# Patient Record
Sex: FEMALE | URBAN - METROPOLITAN AREA
[De-identification: names, ages, dates, MRNs, and addresses within clinical notes are randomized per-mention and may not be internally consistent; named-entity substitution may affect disease eponyms.]

---

## 2018-09-13 DIAGNOSIS — M54.50 LUMBAGO: Primary | ICD-10-CM

## 2018-09-13 DIAGNOSIS — M51.36 DEGENERATION OF LUMBAR INTERVERTEBRAL DISC: ICD-10-CM

## 2018-10-02 ENCOUNTER — CLINICAL SUPPORT (OUTPATIENT)
Dept: REHABILITATION | Facility: OTHER | Age: 63
End: 2018-10-02
Payer: MEDICAID

## 2018-10-02 DIAGNOSIS — G89.29 CHRONIC BILATERAL LOW BACK PAIN WITH BILATERAL SCIATICA: ICD-10-CM

## 2018-10-02 DIAGNOSIS — M25.60 DECREASED RANGE OF MOTION: ICD-10-CM

## 2018-10-02 DIAGNOSIS — M54.42 CHRONIC BILATERAL LOW BACK PAIN WITH BILATERAL SCIATICA: ICD-10-CM

## 2018-10-02 DIAGNOSIS — M54.41 CHRONIC BILATERAL LOW BACK PAIN WITH BILATERAL SCIATICA: ICD-10-CM

## 2018-10-02 DIAGNOSIS — R29.3 POSTURAL IMBALANCE: ICD-10-CM

## 2018-10-02 PROCEDURE — 97161 PT EVAL LOW COMPLEX 20 MIN: CPT | Mod: PN
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
## 2018-10-29 ENCOUNTER — CLINICAL SUPPORT (OUTPATIENT)
Dept: REHABILITATION | Facility: OTHER | Age: 63
End: 2018-10-29
Payer: MEDICAID

## 2018-10-29 DIAGNOSIS — M54.41 CHRONIC BILATERAL LOW BACK PAIN WITH BILATERAL SCIATICA: ICD-10-CM

## 2018-10-29 DIAGNOSIS — G89.29 CHRONIC BILATERAL LOW BACK PAIN WITH BILATERAL SCIATICA: ICD-10-CM

## 2018-10-29 DIAGNOSIS — M54.42 CHRONIC BILATERAL LOW BACK PAIN WITH BILATERAL SCIATICA: ICD-10-CM

## 2018-10-29 DIAGNOSIS — R29.3 POSTURAL IMBALANCE: ICD-10-CM

## 2018-10-29 DIAGNOSIS — M25.60 DECREASED RANGE OF MOTION: ICD-10-CM

## 2018-10-29 PROCEDURE — 97110 THERAPEUTIC EXERCISES: CPT | Mod: PN

## 2018-10-29 NOTE — PROGRESS NOTES
"                            Physical Therapy Daily Treatment Note     Name: Susie Cole  Clinic Number: [id number]    Therapy Diagnosis:   Encounter Diagnoses   Name Primary?    Chronic bilateral low back pain with bilateral sciatica     Decreased range of motion     Postural imbalance      Physician: Myron Vela MD    Visit Date: 10/29/2018  Physician Orders: PT Eval and Treat  Medical Diagnosis from Referral: Lumbago, Degenerationi of lumbar intervertebral disc  Evaluation Date: 10/2/2018  Authorization Period Expiration: 10/31/18  Plan of Care Expiration: 12/31/18  Visit # / Visits authorized: 2/ 6      Time In: 2:18 pm  Time Out: 3:07 pm  Total Billable Time: 25 minutes    Precautions: Standard    Subjective   Pt reports she took her medication prior to PT but she is still having pain. States when she stands to walk her back hurts. Reports improvement in her back pain following session.  She was compliant with home exercise program.  Response to previous treatment: decreased pain following last session  Functional change: pain continues to limit her standing and walking.    Pain: 8/10  Location: back  bilateral    Objective     Susie received therapeutic exercises to develop strength, ROM, flexibility, posture and core stabilization for 39 minutes including:  LTR: 20 reps  SKTC: 3 x 20"  Active hamstring stretch: 20 x 5"  pirifomis stretches 3 x 20"  Gluteal sets 10 x 5"  Bridging 10 x 5"    Susie received cold pack for 10 minutes to low back.      Home Exercises Provided and Patient Education Provided     Education provided:     Written Home Exercises Provided: Patient instructed to cont prior HEP.  Exercises were reviewed and Susie was able to demonstrate them prior to the end of the session.  Susie demonstrated good  understanding of the education provided.     See EMR under Patient Instructions for exercises provided prior visit.    Assessment     Continuing to present to OP PT with increased " low back pain. Patient able to perform there ex in session today without significant increased pain. WIll assess response next visit prior to adding to HEP.    Susie is slowly progressing towards her goals.   Pt prognosis is Fair.     Pt will continue to benefit from skilled outpatient physical therapy to address the deficits listed in the problem list box on initial evaluation, provide pt/family education and to maximize pt's level of independence in the home and community environment.     Pt's spiritual, cultural and educational needs considered and pt agreeable to plan of care and goals.    Anticipated barriers to physical therapy: none    Goals:   Short Term Goals (5 Weeks):   1. Pt will report 20% reduction in pain of the lumbar spine and LE for ease with ADL's (in progress)  2. PT will demonstrate improved trunk strength by a half grade in for ease with upright posture during standing activities. (in progress)  3. Pt will demonstrate improved lumbar spine ROM in all directions by a half grade for ease with bending activities. (in progress)   4. Pt to demonstrate improved functional ability with FOTO limitation <=66% disability. (in progress)     Long Term Goals (10 Weeks):   1. Pt will report being independent with HEP for maintenance of improvements gained during therapy sessions. (in progress)   2. PT will report 50% reduction of pain of the back and LE for ease with dressing and grooming activities.  (in progress)  3. Pt will demonstrate trunk and extremity strength to >=4+/5 without the provocation of pain for ease with household chores. (in progress)  4. Pt will demonstrate appropriate upright posture without external cueing for ease with work related activities. (in progress)  5. Pt to demonstrate improved functional ability with FOTO limitation <=56% disability.(in progress)    Plan     Continue with core strengthening, B LE strengthening, and stretches.    Caesar Gaytan, PT

## 2018-11-15 ENCOUNTER — CLINICAL SUPPORT (OUTPATIENT)
Dept: REHABILITATION | Facility: OTHER | Age: 63
End: 2018-11-15
Payer: MEDICAID

## 2018-11-15 DIAGNOSIS — M54.42 CHRONIC BILATERAL LOW BACK PAIN WITH BILATERAL SCIATICA: ICD-10-CM

## 2018-11-15 DIAGNOSIS — G89.29 CHRONIC BILATERAL LOW BACK PAIN WITH BILATERAL SCIATICA: ICD-10-CM

## 2018-11-15 DIAGNOSIS — M25.60 DECREASED RANGE OF MOTION: ICD-10-CM

## 2018-11-15 DIAGNOSIS — R29.3 POSTURAL IMBALANCE: ICD-10-CM

## 2018-11-15 DIAGNOSIS — M54.41 CHRONIC BILATERAL LOW BACK PAIN WITH BILATERAL SCIATICA: ICD-10-CM

## 2018-11-15 PROCEDURE — 97110 THERAPEUTIC EXERCISES: CPT | Mod: PN | Performed by: PHYSICAL THERAPIST

## 2018-11-15 NOTE — PROGRESS NOTES
"                            Physical Therapy Daily Treatment Note     Name: Susie Cole  Clinic Number: [id number]    Therapy Diagnosis:   Encounter Diagnoses   Name Primary?    Chronic bilateral low back pain with bilateral sciatica     Decreased range of motion     Postural imbalance      Physician: Myron Vela MD    Visit Date: 11/15/2018  Physician Orders: PT Eval and Treat  Medical Diagnosis from Referral: Lumbago, Degeneration of lumbar intervertebral disc  Evaluation Date: 10/2/2018  Authorization Period Expiration: 10/31/18  Plan of Care Expiration: 12/31/18  Visit # / Visits authorized: 3/ 6       Time In: 3:10  pm  Time Out: 4:00 pm  Total Billable Time: 50minutes    Precautions: Standard    Subjective   Pt reports continued pain to B low back today. She says she has been doing exercises at home, but with no change. She says she still has pain with standing and walking.   She was compliant with home exercise program.  Response to previous treatment: decreased pain following last session  Functional change: pain continues to limit her standing and walking.    Pain: 7/10  Location: back  bilateral    Objective       CMS Impairment/Limitation/Restriction for FOTO Lumbar Spine Survey    Therapist reviewed FOTO scores for Susie Cole on 11/15/2018.   FOTO documents entered into [employer] - see Media section.    Limitation Score: 76% (no change from evaluation)  Category: Mobility    Current : CL = least 60% but < 80% impaired, limited or restricted  Goal: CK = at least 40% but < 60% impaired, limited or restricted  Discharge: n/a           Susie received therapeutic exercises to develop strength, ROM, flexibility, posture and core stabilization for 40 minutes including:  LTR: 3 min  SKTC: 3 x 20"  +B KTC with ball 3 min  Active hamstring stretch: 20 x 5"  pirifomis stretches 3 x 20"  Gluteal sets 10 x 5"  Bridging 10 x 5"  +TrA with yoga block squeeze 10" hold x 3 min    Susie received moist heat for " 10 minutes to low back.      Home Exercises Provided and Patient Education Provided     Education provided:   - Office attendance policy    Written Home Exercises Provided: Patient instructed to cont prior HEP.  Exercises were reviewed and Susie was able to demonstrate them prior to the end of the session.  Susie demonstrated good  understanding of the education provided.     See EMR under Patient Instructions for exercises provided prior visit.    Assessment     Pt has made limited progress with therapy due to poor attendance. Today was pt's second treatment since evaluation 10/02/2018. Pt was educated today on importance of attending therapy as well as performing recommended HEP to be able to progress towards goals. Fair tolerance to therex today. Slow pacing of exercises.     Susie is slowly progressing towards her goals.   Pt prognosis is Fair.     Pt will continue to benefit from skilled outpatient physical therapy to address the deficits listed in the problem list box on initial evaluation, provide pt/family education and to maximize pt's level of independence in the home and community environment.     Pt's spiritual, cultural and educational needs considered and pt agreeable to plan of care and goals.    Anticipated barriers to physical therapy: none    Goals:   Short Term Goals (5 Weeks):   1. Pt will report 20% reduction in pain of the lumbar spine and LE for ease with ADL's (in progress)  2. PT will demonstrate improved trunk strength by a half grade in for ease with upright posture during standing activities. (in progress)  3. Pt will demonstrate improved lumbar spine ROM in all directions by a half grade for ease with bending activities. (in progress)   4. Pt to demonstrate improved functional ability with FOTO limitation <=66% disability. (in progress)     Long Term Goals (10 Weeks):   1. Pt will report being independent with HEP for maintenance of improvements gained during therapy sessions. (in  progress)   2. PT will report 50% reduction of pain of the back and LE for ease with dressing and grooming activities.  (in progress)  3. Pt will demonstrate trunk and extremity strength to >=4+/5 without the provocation of pain for ease with household chores. (in progress)  4. Pt will demonstrate appropriate upright posture without external cueing for ease with work related activities. (in progress)  5. Pt to demonstrate improved functional ability with FOTO limitation <=56% disability.(in progress)    Plan     Continue with core strengthening, B LE strengthening, and stretches.    Bridget Schultz, PT

## 2018-11-23 ENCOUNTER — CLINICAL SUPPORT (OUTPATIENT)
Dept: REHABILITATION | Facility: OTHER | Age: 63
End: 2018-11-23
Payer: MEDICAID

## 2018-11-23 DIAGNOSIS — R29.3 POSTURAL IMBALANCE: ICD-10-CM

## 2018-11-23 DIAGNOSIS — M25.60 DECREASED RANGE OF MOTION: ICD-10-CM

## 2018-11-23 DIAGNOSIS — G89.29 CHRONIC BILATERAL LOW BACK PAIN WITH BILATERAL SCIATICA: ICD-10-CM

## 2018-11-23 DIAGNOSIS — M54.42 CHRONIC BILATERAL LOW BACK PAIN WITH BILATERAL SCIATICA: ICD-10-CM

## 2018-11-23 DIAGNOSIS — M54.41 CHRONIC BILATERAL LOW BACK PAIN WITH BILATERAL SCIATICA: ICD-10-CM

## 2018-11-23 PROCEDURE — 97110 THERAPEUTIC EXERCISES: CPT | Mod: PN

## 2018-11-23 NOTE — PROGRESS NOTES
"                            Physical Therapy Daily Treatment Note     Name: Susie Cole  Clinic Number: [id number]    Therapy Diagnosis:   Encounter Diagnoses   Name Primary?    Chronic bilateral low back pain with bilateral sciatica     Decreased range of motion     Postural imbalance      Physician: Myron Vela MD    Visit Date: 11/23/2018  Physician Orders: PT Eval and Treat  Medical Diagnosis from Referral: Lumbago, Degeneration of lumbar intervertebral disc  Evaluation Date: 10/2/2018  Authorization Period Expiration: 10/31/18  Plan of Care Expiration: 12/31/18  Visit # / Visits authorized: 4/ 6       Time In: 11:09  pm  Time Out: 12:02 pm  Total Billable Time: 53 minutes    Precautions: Standard    Subjective   Pt reports that the home exercises help "a little" but that she still has pain in standing.   She was compliant with home exercise program.  Response to previous treatment: decreased pain following last session  Functional change: pain continues to limit her standing and walking.    Pain: 7/10  Location: back  bilateral    Objective       CMS Impairment/Limitation/Restriction for FOTO Lumbar Spine Survey    Therapist reviewed FOTO scores for Susie Cole on 11/23/2018.   FOTO documents entered into [employer] - see Media section.    Limitation Score: 76% (no change from evaluation)  Category: Mobility    Current : CL = least 60% but < 80% impaired, limited or restricted  Goal: CK = at least 40% but < 60% impaired, limited or restricted  Discharge: n/a           Susie received therapeutic exercises to develop strength, ROM, flexibility, posture and core stabilization for 40 minutes including:    LTR: 3 min w/PB  DKTC with ball 3 min  SKTC: 3 x 20"  Active hamstring stretch: 20 x 5"  pirifomis stretches 3 x 20"  Gluteal sets 10 x 5"  Bridging 10 x 5"  TrA with yoga block squeeze 10" hold x 3 min - NP today  +PPT: 10x  +seated PB roll out: 10x    +narrow rows: 20 x OTB  +SALPD 20 x OTB    Susie " received moist heat for 10 minutes to low back. - performed simultaneous with therex today      Home Exercises Provided and Patient Education Provided     Education provided:   - Office attendance policy  - use of sitting and standing trunk flexion frequently throughout the day to reduce c/o pain and improve tolerance with walking and standing.    Written Home Exercises Provided: Patient instructed to cont prior HEP.  Exercises were reviewed and Susie was able to demonstrate them prior to the end of the session.  Susie demonstrated good  understanding of the education provided.     See EMR under Patient Instructions for exercises provided prior visit.    Assessment     Pt has made limited progress with therapy due to poor attendance. Today was pt's fourth treatment since evaluation on 10/02/2018. Pt was educated today on importance of attending therapy as well as performing recommended HEP to be able to progress towards goals. Fair tolerance to therex today. Pt required VC to stay on task today and for scapular positioning during new exercises as pt seemed drowsy and moved slowly through exercises.     Susie is slowly progressing towards her goals.   Pt prognosis is Fair.     Pt will continue to benefit from skilled outpatient physical therapy to address the deficits listed in the problem list box on initial evaluation, provide pt/family education and to maximize pt's level of independence in the home and community environment.     Pt's spiritual, cultural and educational needs considered and pt agreeable to plan of care and goals.    Anticipated barriers to physical therapy: none    Goals:   Short Term Goals (5 Weeks):   1. Pt will report 20% reduction in pain of the lumbar spine and LE for ease with ADL's (in progress)  2. PT will demonstrate improved trunk strength by a half grade in for ease with upright posture during standing activities. (in progress)  3. Pt will demonstrate improved lumbar spine ROM in all  directions by a half grade for ease with bending activities. (in progress)   4. Pt to demonstrate improved functional ability with FOTO limitation <=66% disability. (in progress)     Long Term Goals (10 Weeks):   1. Pt will report being independent with HEP for maintenance of improvements gained during therapy sessions. (in progress)   2. PT will report 50% reduction of pain of the back and LE for ease with dressing and grooming activities.  (in progress)  3. Pt will demonstrate trunk and extremity strength to >=4+/5 without the provocation of pain for ease with household chores. (in progress)  4. Pt will demonstrate appropriate upright posture without external cueing for ease with work related activities. (in progress)  5. Pt to demonstrate improved functional ability with FOTO limitation <=56% disability.(in progress)    Plan     Continue with core strengthening, B LE strengthening, and stretches.    Elham Dominguez, PT

## 2018-11-29 ENCOUNTER — CLINICAL SUPPORT (OUTPATIENT)
Dept: REHABILITATION | Facility: OTHER | Age: 63
End: 2018-11-29
Payer: MEDICAID

## 2018-11-29 DIAGNOSIS — G89.29 CHRONIC BILATERAL LOW BACK PAIN WITH BILATERAL SCIATICA: ICD-10-CM

## 2018-11-29 DIAGNOSIS — R29.3 POSTURAL IMBALANCE: ICD-10-CM

## 2018-11-29 DIAGNOSIS — M54.41 CHRONIC BILATERAL LOW BACK PAIN WITH BILATERAL SCIATICA: ICD-10-CM

## 2018-11-29 DIAGNOSIS — M25.60 DECREASED RANGE OF MOTION: ICD-10-CM

## 2018-11-29 DIAGNOSIS — M54.42 CHRONIC BILATERAL LOW BACK PAIN WITH BILATERAL SCIATICA: ICD-10-CM

## 2018-11-29 PROCEDURE — 97110 THERAPEUTIC EXERCISES: CPT | Mod: PN

## 2018-11-29 NOTE — PROGRESS NOTES
"                            Physical Therapy Daily Treatment Note     Name: Susie Cole  Clinic Number: [id number]    Therapy Diagnosis:   Encounter Diagnoses   Name Primary?    Chronic bilateral low back pain with bilateral sciatica     Decreased range of motion     Postural imbalance      Physician: Myron Vela MD    Visit Date: 11/29/2018  Physician Orders: PT Eval and Treat  Medical Diagnosis from Referral: Lumbago, Degeneration of lumbar intervertebral disc  Evaluation Date: 10/2/2018  Authorization Period Expiration: 10/31/18  Plan of Care Expiration: 12/31/18  Visit # / Visits authorized: 5/6      Time In: 3:00 PM  Time Out: 4:00 pm  Total Billable Time: 30 minutes    Precautions: Standard    Subjective   Pt reports she is in more pain during standing, less with sitting and lying down.   She was compliant with home exercise program.  Response to previous treatment: decreased pain following last session  Functional change: pain continues to limit her standing and walking.    Pain: 7/10  Location: back  bilateral    Objective       CMS Impairment/Limitation/Restriction for FOTO Lumbar Spine Survey    Therapist reviewed FOTO scores for Susie Cole on 11/29/2018.   FOTO documents entered into [employer] - see Media section.    Limitation Score: 76% (no change from evaluation)  Category: Mobility    Current : CL = least 60% but < 80% impaired, limited or restricted  Goal: CK = at least 40% but < 60% impaired, limited or restricted  Discharge: n/a           Susie received therapeutic exercises to develop strength, ROM, flexibility, posture and core stabilization for 40 minutes including:    LTR: 3 min w/PB  DKTC with ball 3 min  SKTC: 3 x 20"  Active hamstring stretch: 20 x 5"  pirifomis stretches 3 x 20"  Gluteal sets 10 x 5"  Bridging 10 x 5"  TrA with yoga block squeeze 10" hold x 3 min - NP today  +PPT: 10x  +seated PB roll out: 10x    +narrow rows: 20 x OTB  +SALPD 20 x OTB    Susie received moist " heat for 10 minutes to low back. - performed simultaneous with therex today      Home Exercises Provided and Patient Education Provided     Education provided:   - Office attendance policy  - use of sitting and standing trunk flexion frequently throughout the day to reduce c/o pain and improve tolerance with walking and standing.    Written Home Exercises Provided: Patient instructed to cont prior HEP.  Exercises were reviewed and Susie was able to demonstrate them prior to the end of the session.  Susie demonstrated good  understanding of the education provided.     See EMR under Patient Instructions for exercises provided prior visit.    Assessment     Pt required constant verbal / tactile cueing to stay on task and for proper technique and form. No reports of increased pain with all therex.    Susie is slowly progressing towards her goals.   Pt prognosis is Fair.     Pt will continue to benefit from skilled outpatient physical therapy to address the deficits listed in the problem list box on initial evaluation, provide pt/family education and to maximize pt's level of independence in the home and community environment.     Pt's spiritual, cultural and educational needs considered and pt agreeable to plan of care and goals.    Anticipated barriers to physical therapy: none    Goals:   Short Term Goals (5 Weeks):   1. Pt will report 20% reduction in pain of the lumbar spine and LE for ease with ADL's (in progress)  2. PT will demonstrate improved trunk strength by a half grade in for ease with upright posture during standing activities. (in progress)  3. Pt will demonstrate improved lumbar spine ROM in all directions by a half grade for ease with bending activities. (in progress)   4. Pt to demonstrate improved functional ability with FOTO limitation <=66% disability. (in progress)     Long Term Goals (10 Weeks):   1. Pt will report being independent with HEP for maintenance of improvements gained during therapy  sessions. (in progress)   2. PT will report 50% reduction of pain of the back and LE for ease with dressing and grooming activities.  (in progress)  3. Pt will demonstrate trunk and extremity strength to >=4+/5 without the provocation of pain for ease with household chores. (in progress)  4. Pt will demonstrate appropriate upright posture without external cueing for ease with work related activities. (in progress)  5. Pt to demonstrate improved functional ability with FOTO limitation <=56% disability.(in progress)    Plan     Continue with core strengthening, B LE strengthening, and stretches.    Branden Hilton, PTA

## 2018-12-04 NOTE — PROGRESS NOTES
"                            Physical Therapy Daily Treatment Note     Name: Susie Cole  Clinic Number: [id number]    Therapy Diagnosis:   Encounter Diagnoses   Name Primary?    Chronic bilateral low back pain with bilateral sciatica     Decreased range of motion     Postural imbalance      Physician: Myron Vela MD    Visit Date: 12/5/2018  Physician Orders: PT Eval and Treat  Medical Diagnosis from Referral: Lumbago, Degeneration of lumbar intervertebral disc  Evaluation Date: 10/2/2018  Authorization Period Expiration: 10/31/18  Plan of Care Expiration: 12/31/18  Visit # / Visits authorized: 5/6      Time In: 3:00 PM  Time Out: 4:00 pm  Total Billable Time: 30 minutes - PN today    Precautions: Standard    Subjective   Pt reports that she stretches help some. She continues to have severe low back pain with prolonged standing and walking.  She was compliant with home exercise program.  Response to previous treatment: decreased pain following last session  Functional change: pain continues to limit her standing and walking.    Pain: 7/10  Location: back  bilateral    Objective     Measurements updated 12/5/18    Postural examination/scapula alignment: Rounded shoulder, Head forward, Slouched posture - UA to self correct without VC     Thoracic/Lumbar AROM: Pain/Dysfunction with Movement:   Flexion Max-Mod limited, fingertips 1" below patella, c/o endrange pain    Extension Max limited, c/o endrange pain   Right side bending Mod-Max limited, c/o increased pain in R low back, fingertips 2" above knee joint line   Left side bending Mod-Max limited, c/o increased pain in L low back, fingertips 2" above knee joint line   Right rotation active 25% with pain, passive: WFL with pain   Left rotation Active 25% with pain, passice WFL       Hip ROM: WFL all directions. Limited R hip ER (<10 deg), L hip IR (<20 deg)  Knee ROM: WFL all directions  Lower Extremity Strength  Right LE   Left LE     Hip flexion: 4/5 " "Hip flexion: 4/5   Hip extension:  4/5 Hip extension: 4/5   Hip abduction: 4+/5 Hip abduction: 4+/5   Hip adduction:  4+/5 Hip adduction:  4+/5   Hip Internal rotation    4/5 Hip Internal rotation 4/5   Knee Flexion 4+/5 Knee Flexion 4/5   Knee Extension 4+/5 Knee Extension 4+/5   Ankle dorsiflexion: 4/5 Ankle dorsiflexion: 4/5   Ankle plantarflexion: 4/5 Ankle plantarflexion: 4/5      Flexibility: hip flexors/quads = poor, hamstrings = fair- (90-90 position: R lacks 20 deg, L lacks 40 deg)  Joint Mobility: NT today 2* pain/guarding     Special Tests:   Test Name  Test Result   Prone Instability Test NT today   Lumbar Quadrant test (--)   Straight Leg Raise (--)   Neural Tension Test (Slump) (--)   Crossed Straight Leg Raise (--)   Walking on toes (--)   Walking on heels  (--)   Clonus (--)   MONICO (+)   FADIR (+)   SI Joint Provocation Test (compression / distraction) (--)      Balance: SLS: R <3" without HHA for balance to limit fall. L <1" before LOB           CMS Impairment/Limitation/Restriction for FOTO Lumbar Spine Survey    Therapist reviewed FOTO scores for Susie Cole on 12/5/2018.   FOTO documents entered into [employer] - see Media section.    Limitation Score: 76% (no change from evaluation)  Category: Mobility    Current : CL = least 60% but < 80% impaired, limited or restricted  Goal: CK = at least 40% but < 60% impaired, limited or restricted  Discharge: n/a           Susie received therapeutic exercises to develop strength, ROM, flexibility, posture and core stabilization for 40 minutes including:        LTR: 3 min w/PB - NP  DKTC with ball 3 min - NP  SKTC: 3 x 20" - NP  Active hamstring stretch: 20 x 5" - NP  pirifomis stretches 3 x 20" - NP  Gluteal sets 10 x 5" - NP  Bridging 10 x 5" - NP  TrA with yoga block squeeze 10" hold x 3 min - NP today    Supine PPT: 20x  Standing PPT against wall: 10x  seated PB roll out: 10x  +sit to stands: 10 x w/ TA  +step ups (fwd): 2 x 10 x 6" w/ TA   +narrow rows: " 20 x OTB  +SALPD 20 x OTB    Susie received moist heat for 10 minutes to low back. - performed simultaneous with therex today      Home Exercises Provided and Patient Education Provided     Education provided:   - Office attendance policy  - use of sitting and standing trunk flexion frequently throughout the day to reduce c/o pain and improve tolerance with walking and standing.    Written Home Exercises Provided: Patient instructed to cont prior HEP.  Exercises were reviewed and Susie was able to demonstrate them prior to the end of the session.  Susie demonstrated good  understanding of the education provided.     See EMR under Patient Instructions for exercises provided prior visit.    Assessment     Pt presents with slow improvements in ROM and strength despite continued c/o pain. Poor compliance and inconsistencies with therapy attendance continue to limit further progression towards therapeutic goals. Progressed therex today to promote functional strength in wieght bearing positions. Pt tolerated well with good training effect despite c/o pain. Poor core strength and postural awareness continue to promote poor spine alignment and poor dynamic spine stability with standing and walking.    Susie is slowly progressing towards her goals.   Pt prognosis is Fair.     Pt will continue to benefit from skilled outpatient physical therapy to address the deficits listed in the problem list box on initial evaluation, provide pt/family education and to maximize pt's level of independence in the home and community environment.     Pt's spiritual, cultural and educational needs considered and pt agreeable to plan of care and goals.    Anticipated barriers to physical therapy: none    Goals:   Short Term Goals (5 Weeks):   1. Pt will report 20% reduction in pain of the lumbar spine and LE for ease with ADL's (in progress)  2. PT will demonstrate improved trunk strength by a half grade in for ease with upright posture during  standing activities. (in progress)  3. Pt will demonstrate improved lumbar spine ROM in all directions by a half grade for ease with bending activities. (in progress)   4. Pt to demonstrate improved functional ability with FOTO limitation <=66% disability. (in progress)     Long Term Goals (10 Weeks):   1. Pt will report being independent with HEP for maintenance of improvements gained during therapy sessions. (in progress)   2. PT will report 50% reduction of pain of the back and LE for ease with dressing and grooming activities.  (in progress)  3. Pt will demonstrate trunk and extremity strength to >=4+/5 without the provocation of pain for ease with household chores. (in progress)  4. Pt will demonstrate appropriate upright posture without external cueing for ease with work related activities. (in progress)  5. Pt to demonstrate improved functional ability with FOTO limitation <=56% disability.(in progress)    Plan     Continue with core strengthening, B LE strengthening, and stretches.    Elham Dominguez, PT

## 2018-12-05 ENCOUNTER — CLINICAL SUPPORT (OUTPATIENT)
Dept: REHABILITATION | Facility: OTHER | Age: 63
End: 2018-12-05
Payer: MEDICAID

## 2018-12-05 DIAGNOSIS — M54.42 CHRONIC BILATERAL LOW BACK PAIN WITH BILATERAL SCIATICA: ICD-10-CM

## 2018-12-05 DIAGNOSIS — R29.3 POSTURAL IMBALANCE: ICD-10-CM

## 2018-12-05 DIAGNOSIS — M25.60 DECREASED RANGE OF MOTION: ICD-10-CM

## 2018-12-05 DIAGNOSIS — M54.41 CHRONIC BILATERAL LOW BACK PAIN WITH BILATERAL SCIATICA: ICD-10-CM

## 2018-12-05 DIAGNOSIS — G89.29 CHRONIC BILATERAL LOW BACK PAIN WITH BILATERAL SCIATICA: ICD-10-CM

## 2018-12-05 PROCEDURE — 97110 THERAPEUTIC EXERCISES: CPT | Mod: PN

## 2018-12-07 ENCOUNTER — CLINICAL SUPPORT (OUTPATIENT)
Dept: REHABILITATION | Facility: OTHER | Age: 63
End: 2018-12-07
Payer: MEDICAID

## 2018-12-07 DIAGNOSIS — R29.3 POSTURAL IMBALANCE: ICD-10-CM

## 2018-12-07 DIAGNOSIS — M25.60 DECREASED RANGE OF MOTION: ICD-10-CM

## 2018-12-07 DIAGNOSIS — M54.41 CHRONIC BILATERAL LOW BACK PAIN WITH BILATERAL SCIATICA: ICD-10-CM

## 2018-12-07 DIAGNOSIS — M54.42 CHRONIC BILATERAL LOW BACK PAIN WITH BILATERAL SCIATICA: ICD-10-CM

## 2018-12-07 DIAGNOSIS — G89.29 CHRONIC BILATERAL LOW BACK PAIN WITH BILATERAL SCIATICA: ICD-10-CM

## 2018-12-07 PROCEDURE — 97110 THERAPEUTIC EXERCISES: CPT | Mod: PN

## 2018-12-07 NOTE — PROGRESS NOTES
"                            Physical Therapy Daily Treatment Note     Name: Susie Cole  Clinic Number: [id number]    Therapy Diagnosis:   Encounter Diagnoses   Name Primary?    Chronic bilateral low back pain with bilateral sciatica     Decreased range of motion     Postural imbalance      Physician: Myron Vela MD    Visit Date: 12/7/2018  Physician Orders: PT Eval and Treat  Medical Diagnosis from Referral: Lumbago, Degeneration of lumbar intervertebral disc  Evaluation Date: 10/2/2018  Authorization Period Expiration: 10/31/18  Plan of Care Expiration: 12/31/18  Visit # / Visits authorized: 5/6      Time In: 8:34 AM - Pt 34 min for appt today  Time Out: 9:00 AM  Total Billable Time: 26 minutes     Precautions: Standard    Subjective   Pt reports some soreness after the previous session but is feeling ok overall.  She was compliant with home exercise program.  Response to previous treatment: decreased pain following last session  Functional change: pain continues to limit her standing and walking.    Pain: 7/10  Location: back  bilateral    Objective     Susie received therapeutic exercises to develop strength, ROM, flexibility, posture and core stabilization for 40 minutes including:    Recumbent bike: 5 minutes (collected history, assessed pt complaints, education on causes of back pain; importance of exercise)      LTR: 3 min w/PB - NP  DKTC with ball 3 min - NP  SKTC: 3 x 20" - NP  Active hamstring stretch: 20 x 5" - NP  pirifomis stretches 3 x 20" - NP  Gluteal sets 10 x 5" - NP  Bridging 10 x 5" - NP  TrA with yoga block squeeze 10" hold x 3 min - NP today  Supine PPT: 20x    Standing PPT against wall: 10x  seated PB roll out: 10x  sit to stands: 10 x w/ TA  step ups (fwd): 2 x 10 x 6" w/ TA   narrow rows: 20 x OTB  SALPD 20 x OTB  +Antirotations: 1 x 10 x YTB    Susie received moist heat for 00 minutes to low back. - performed simultaneous with therex today      Home Exercises Provided and " Patient Education Provided     Education provided:   - Office attendance policy  - use of sitting and standing trunk flexion frequently throughout the day to reduce c/o pain and improve tolerance with walking and standing.    Written Home Exercises Provided: Patient instructed to cont prior HEP.  Exercises were reviewed and Susie was able to demonstrate them prior to the end of the session.  Susie demonstrated good  understanding of the education provided.     See EMR under Patient Instructions for exercises provided prior visit.    Assessment     Limited progression of therex due to pt being 34 min late for appt today. Pt demo's fair TA activation but requires verbal reminders to maintain core activation during dual tasking with bands. Pt presents with poor posterior pelvic tilt due to decreased NM coordionation and poor body awareness.    Susie is slowly progressing towards her goals.   Pt prognosis is Fair.     Pt will continue to benefit from skilled outpatient physical therapy to address the deficits listed in the problem list box on initial evaluation, provide pt/family education and to maximize pt's level of independence in the home and community environment.     Pt's spiritual, cultural and educational needs considered and pt agreeable to plan of care and goals.    Anticipated barriers to physical therapy: none    Goals:   Short Term Goals (5 Weeks):   1. Pt will report 20% reduction in pain of the lumbar spine and LE for ease with ADL's (in progress)  2. PT will demonstrate improved trunk strength by a half grade in for ease with upright posture during standing activities. (in progress)  3. Pt will demonstrate improved lumbar spine ROM in all directions by a half grade for ease with bending activities. (in progress)   4. Pt to demonstrate improved functional ability with FOTO limitation <=66% disability. (in progress)     Long Term Goals (10 Weeks):   1. Pt will report being independent with HEP for  maintenance of improvements gained during therapy sessions. (in progress)   2. PT will report 50% reduction of pain of the back and LE for ease with dressing and grooming activities.  (in progress)  3. Pt will demonstrate trunk and extremity strength to >=4+/5 without the provocation of pain for ease with household chores. (in progress)  4. Pt will demonstrate appropriate upright posture without external cueing for ease with work related activities. (in progress)  5. Pt to demonstrate improved functional ability with FOTO limitation <=56% disability.(in progress)    Plan     Continue with core strengthening, B LE strengthening, and stretches.    Elham Dominguez, PT

## 2018-12-18 ENCOUNTER — CLINICAL SUPPORT (OUTPATIENT)
Dept: REHABILITATION | Facility: OTHER | Age: 63
End: 2018-12-18
Payer: MEDICAID

## 2018-12-18 DIAGNOSIS — M25.60 DECREASED RANGE OF MOTION: ICD-10-CM

## 2018-12-18 DIAGNOSIS — R29.3 POSTURAL IMBALANCE: ICD-10-CM

## 2018-12-18 DIAGNOSIS — G89.29 CHRONIC BILATERAL LOW BACK PAIN WITH BILATERAL SCIATICA: ICD-10-CM

## 2018-12-18 DIAGNOSIS — M54.41 CHRONIC BILATERAL LOW BACK PAIN WITH BILATERAL SCIATICA: ICD-10-CM

## 2018-12-18 DIAGNOSIS — M54.42 CHRONIC BILATERAL LOW BACK PAIN WITH BILATERAL SCIATICA: ICD-10-CM

## 2018-12-18 PROCEDURE — 97110 THERAPEUTIC EXERCISES: CPT | Mod: PN

## 2018-12-18 NOTE — PROGRESS NOTES
"                            Physical Therapy Daily Treatment Note     Name: Susie Cole  Clinic Number: [id number]    Therapy Diagnosis:   Encounter Diagnoses   Name Primary?    Chronic bilateral low back pain with bilateral sciatica     Decreased range of motion     Postural imbalance      Physician: Myron Vela MD    Visit Date: 12/18/2018  Physician Orders: PT Eval and Treat  Medical Diagnosis from Referral: Lumbago, Degeneration of lumbar intervertebral disc  Evaluation Date: 10/2/2018  Authorization Period Expiration: 10/31/18  Plan of Care Expiration: 12/31/18  Visit # / Visits authorized: 1/6 + 5/6 + 1/1 (total = 7)      Time In: 9:15 AM   Time Out: 10:10 AM  Total Billable Time: 55 minutes     Precautions: Standard    Subjective   Pt reports increased soreness after the previous session. She is able to get OOB with increased ease and with less pain.    She was compliant with home exercise program.  Response to previous treatment: decreased pain following last session  Functional change: pain continues to limit her standing and walking.    Pain: 7/10  Location: back  bilateral    Objective     Susie received therapeutic exercises to develop strength, ROM, flexibility, posture and core stabilization for 55 minutes including:    Recumbent bike: 5 minutes (collected history, assessed pt complaints, education on causes of back pain; importance of exercise)      LTR: 3 min w/PB   DKTC with ball 3 min - NP  SKTC: 3 x 20" - NP  Active hamstring stretch: 20 x 5" - NP  pirifomis stretches 3 x 20" - NP  Gluteal sets 10 x 5" - NP  Bridging 10 x 5" - NP  TrA with yoga block squeeze 10" hold x 3 min - NP today  Supine PPT: 20x    Standing PPT against wall: 10x  seated PB roll out: 10x  sit to stands: 10 x w/ TA  step ups (fwd): 2 x 10 x 6" w/ TA   narrow rows: 20 x OTB  SALPD 20 x OTB  Antirotations: 1 x 10 x YTB  +seated pullaparts: 2 x 10 x OTB    Susie received moist heat for 00 minutes to low back. - " performed simultaneous with therex today      Home Exercises Provided and Patient Education Provided     Education provided:   - Office attendance policy  - use of sitting and standing trunk flexion frequently throughout the day to reduce c/o pain and improve tolerance with walking and standing.    Written Home Exercises Provided: Patient instructed to cont prior HEP.  Exercises were reviewed and Susie was able to demonstrate them prior to the end of the session.  Susie demonstrated good  understanding of the education provided.     See EMR under Patient Instructions for exercises provided prior visit.    Assessment     Pt able to activate abdominals but is UA to maintain activation while breathing simultaneously. Educated on Valsalva maneuver and to avoid breath-holding. Pt requires mod VC/TC for appropriate scapular activation and scapular positioning due to decreased postural awareness and decreased NM coordionation.    Susie is slowly progressing towards her goals.   Pt prognosis is Fair.     Pt will continue to benefit from skilled outpatient physical therapy to address the deficits listed in the problem list box on initial evaluation, provide pt/family education and to maximize pt's level of independence in the home and community environment.     Pt's spiritual, cultural and educational needs considered and pt agreeable to plan of care and goals.    Anticipated barriers to physical therapy: none    Goals:   Short Term Goals (5 Weeks):   1. Pt will report 20% reduction in pain of the lumbar spine and LE for ease with ADL's (in progress)  2. PT will demonstrate improved trunk strength by a half grade in for ease with upright posture during standing activities. (in progress)  3. Pt will demonstrate improved lumbar spine ROM in all directions by a half grade for ease with bending activities. (in progress)   4. Pt to demonstrate improved functional ability with FOTO limitation <=66% disability. (in progress)      Long Term Goals (10 Weeks):   1. Pt will report being independent with HEP for maintenance of improvements gained during therapy sessions. (in progress)   2. PT will report 50% reduction of pain of the back and LE for ease with dressing and grooming activities.  (in progress)  3. Pt will demonstrate trunk and extremity strength to >=4+/5 without the provocation of pain for ease with household chores. (in progress)  4. Pt will demonstrate appropriate upright posture without external cueing for ease with work related activities. (in progress)  5. Pt to demonstrate improved functional ability with FOTO limitation <=56% disability.(in progress)    Plan     Continue with core strengthening, B LE strengthening, and stretches.    Elham Dominguez, PT

## 2018-12-26 ENCOUNTER — CLINICAL SUPPORT (OUTPATIENT)
Dept: REHABILITATION | Facility: OTHER | Age: 63
End: 2018-12-26
Payer: MEDICAID

## 2018-12-26 DIAGNOSIS — M54.42 CHRONIC BILATERAL LOW BACK PAIN WITH BILATERAL SCIATICA: ICD-10-CM

## 2018-12-26 DIAGNOSIS — M54.41 CHRONIC BILATERAL LOW BACK PAIN WITH BILATERAL SCIATICA: ICD-10-CM

## 2018-12-26 DIAGNOSIS — G89.29 CHRONIC BILATERAL LOW BACK PAIN WITH BILATERAL SCIATICA: ICD-10-CM

## 2018-12-26 DIAGNOSIS — R29.3 POSTURAL IMBALANCE: ICD-10-CM

## 2018-12-26 DIAGNOSIS — M25.60 DECREASED RANGE OF MOTION: ICD-10-CM

## 2018-12-26 PROCEDURE — 97110 THERAPEUTIC EXERCISES: CPT | Mod: PN

## 2018-12-26 NOTE — PROGRESS NOTES
"                            Physical Therapy Daily Treatment Note     Name: Susie Cole  Clinic Number: [id number]    Therapy Diagnosis:   Encounter Diagnoses   Name Primary?    Chronic bilateral low back pain with bilateral sciatica     Decreased range of motion     Postural imbalance      Physician: Myron Vela MD    Visit Date: 12/26/2018  Physician Orders: PT Eval and Treat  Medical Diagnosis from Referral: Lumbago, Degeneration of lumbar intervertebral disc  Evaluation Date: 10/2/2018  Authorization Period Expiration: 10/31/18  Plan of Care Expiration: 12/31/18  Visit # / Visits authorized: 2/6 + 5/6 + 1/1 (total = 8)      Time In: 11:00 AM   Time Out: 12:00 AM  Total Billable Time: 60 minutes     Precautions: Standard    Subjective   Pt reports: soreness and stiffness today, "but today is a better day than the last time."    She was compliant with home exercise program.  Response to previous treatment: decreased pain following last session  Functional change: pain continues to limit her standing and walking.    Pain: 7/10  Location: back  bilateral    Objective     Susie received therapeutic exercises to develop strength, ROM, flexibility, posture and core stabilization for 55 minutes including:    Recumbent bike: 5 minutes (collected history, assessed pt complaints, education on causes of back pain; importance of exercise)      LTR: 3 min w/PB   DKTC with ball 3 min   SKTC: 3 x 20" - NP  Active hamstring stretch: 20 x 5" - NP  pirifomis stretches 3 x 20" - NP  Gluteal sets 10 x 5" - NP  Bridging 10 x 5" - NP  TrA with yoga block squeeze 10" hold x 3 min - NP today  Supine PPT: 20x    Standing PPT against wall: 10x  seated PB roll out: 10x (3-way today)  sit to stands: 10 x 2 w/ TA  step ups (fwd): 2 x 10 x 6" w/ TA   narrow rows: 20 x GTB  SALPD 20 x OTB  Antirotations: 1 x 10 x GTB  seated pullaparts: 2 x 10 x OTB    Susie received moist heat for 00 minutes to low back. - performed simultaneous " with therex today      Home Exercises Provided and Patient Education Provided     Education provided:   - Office attendance policy  - use of sitting and standing trunk flexion frequently throughout the day to reduce c/o pain and improve tolerance with walking and standing.    Written Home Exercises Provided: Patient instructed to cont prior HEP.  Exercises were reviewed and Susie was able to demonstrate them prior to the end of the session.  Susie demonstrated good  understanding of the education provided.     See EMR under Patient Instructions for exercises provided prior visit.    Assessment     Pt able to tolerate increased resistance with standing trunk exercises, with improving trunk strength.     Susie is slowly progressing towards her goals.   Pt prognosis is Fair.     Pt will continue to benefit from skilled outpatient physical therapy to address the deficits listed in the problem list box on initial evaluation, provide pt/family education and to maximize pt's level of independence in the home and community environment.     Pt's spiritual, cultural and educational needs considered and pt agreeable to plan of care and goals.    Anticipated barriers to physical therapy: none    Goals:   Short Term Goals (5 Weeks):   1. Pt will report 20% reduction in pain of the lumbar spine and LE for ease with ADL's (in progress)  2. PT will demonstrate improved trunk strength by a half grade in for ease with upright posture during standing activities. (in progress)  3. Pt will demonstrate improved lumbar spine ROM in all directions by a half grade for ease with bending activities. (in progress)   4. Pt to demonstrate improved functional ability with FOTO limitation <=66% disability. (in progress)     Long Term Goals (10 Weeks):   1. Pt will report being independent with HEP for maintenance of improvements gained during therapy sessions. (in progress)   2. PT will report 50% reduction of pain of the back and LE for ease with  dressing and grooming activities.  (in progress)  3. Pt will demonstrate trunk and extremity strength to >=4+/5 without the provocation of pain for ease with household chores. (in progress)  4. Pt will demonstrate appropriate upright posture without external cueing for ease with work related activities. (in progress)  5. Pt to demonstrate improved functional ability with FOTO limitation <=56% disability.(in progress)    Plan     Continue with core strengthening, B LE strengthening, and stretches.    Elham Dominguez, PT

## 2019-03-06 ENCOUNTER — DOCUMENTATION ONLY (OUTPATIENT)
Dept: REHABILITATION | Facility: OTHER | Age: 64
End: 2019-03-06

## 2019-03-06 DIAGNOSIS — M54.41 CHRONIC BILATERAL LOW BACK PAIN WITH BILATERAL SCIATICA: ICD-10-CM

## 2019-03-06 DIAGNOSIS — M25.60 DECREASED RANGE OF MOTION: ICD-10-CM

## 2019-03-06 DIAGNOSIS — R29.3 POSTURAL IMBALANCE: ICD-10-CM

## 2019-03-06 DIAGNOSIS — M54.42 CHRONIC BILATERAL LOW BACK PAIN WITH BILATERAL SCIATICA: ICD-10-CM

## 2019-03-06 DIAGNOSIS — G89.29 CHRONIC BILATERAL LOW BACK PAIN WITH BILATERAL SCIATICA: ICD-10-CM

## 2019-03-06 NOTE — PROGRESS NOTES
[facility] OUTPATIENT DISCHARGE SUMMARY  Physical Therapy      Name:  Susie Cole  Date:  3/6/2019    Date of Evaluation:  10/02/2018  Physician:  Myron Vela MD  Total # Of Visits:  9  Diagnosis:    Encounter Diagnoses   Name Primary?    Chronic bilateral low back pain with bilateral sciatica     Decreased range of motion     Postural imbalance          Physical/Functional Status:  Unable to assess pt's functional limitations and current impairments due to pt not returning to the clinic.     The patient is to be discharged from our Therapy service for the following reason(s):  Patient has not attended therapy since 12/26/2018    Degree of Goal Achievement: Patient has not met goals secondary to:  Not returning to clinic for follow up assessment.    Patient Education: None provided    Discharge Plan:  D/C due to non-compliance for remaining visit.    Elham Dominguez, PT  3/6/2019